# Patient Record
Sex: FEMALE | Race: WHITE | NOT HISPANIC OR LATINO
[De-identification: names, ages, dates, MRNs, and addresses within clinical notes are randomized per-mention and may not be internally consistent; named-entity substitution may affect disease eponyms.]

---

## 2017-12-18 ENCOUNTER — RECORD ABSTRACTING (OUTPATIENT)
Age: 77
End: 2017-12-18

## 2017-12-18 DIAGNOSIS — Z86.79 PERSONAL HISTORY OF OTHER DISEASES OF THE CIRCULATORY SYSTEM: ICD-10-CM

## 2017-12-18 DIAGNOSIS — Z80.9 FAMILY HISTORY OF MALIGNANT NEOPLASM, UNSPECIFIED: ICD-10-CM

## 2017-12-18 DIAGNOSIS — Z87.19 PERSONAL HISTORY OF OTHER DISEASES OF THE DIGESTIVE SYSTEM: ICD-10-CM

## 2017-12-18 DIAGNOSIS — I36.1 NONRHEUMATIC TRICUSPID (VALVE) INSUFFICIENCY: ICD-10-CM

## 2017-12-18 DIAGNOSIS — Z78.9 OTHER SPECIFIED HEALTH STATUS: ICD-10-CM

## 2017-12-18 DIAGNOSIS — Z87.891 PERSONAL HISTORY OF NICOTINE DEPENDENCE: ICD-10-CM

## 2017-12-18 DIAGNOSIS — Z86.69 PERSONAL HISTORY OF OTHER DISEASES OF THE NERVOUS SYSTEM AND SENSE ORGANS: ICD-10-CM

## 2017-12-18 DIAGNOSIS — E66.01 MORBID (SEVERE) OBESITY DUE TO EXCESS CALORIES: ICD-10-CM

## 2017-12-20 ENCOUNTER — APPOINTMENT (OUTPATIENT)
Dept: CARDIOLOGY | Facility: CLINIC | Age: 77
End: 2017-12-20
Payer: MEDICARE

## 2017-12-20 VITALS
BODY MASS INDEX: 27.32 KG/M2 | WEIGHT: 170 LBS | SYSTOLIC BLOOD PRESSURE: 160 MMHG | HEART RATE: 60 BPM | HEIGHT: 66 IN | DIASTOLIC BLOOD PRESSURE: 82 MMHG

## 2017-12-20 DIAGNOSIS — Z86.39 PERSONAL HISTORY OF OTHER ENDOCRINE, NUTRITIONAL AND METABOLIC DISEASE: ICD-10-CM

## 2017-12-20 DIAGNOSIS — I51.7 CARDIOMEGALY: ICD-10-CM

## 2017-12-20 DIAGNOSIS — Z87.898 PERSONAL HISTORY OF OTHER SPECIFIED CONDITIONS: ICD-10-CM

## 2017-12-20 PROCEDURE — 99214 OFFICE O/P EST MOD 30 MIN: CPT

## 2018-01-05 ENCOUNTER — APPOINTMENT (OUTPATIENT)
Dept: CARDIOLOGY | Facility: CLINIC | Age: 78
End: 2018-01-05

## 2018-01-10 ENCOUNTER — APPOINTMENT (OUTPATIENT)
Dept: CARDIOLOGY | Facility: CLINIC | Age: 78
End: 2018-01-10

## 2018-01-18 ENCOUNTER — APPOINTMENT (OUTPATIENT)
Dept: CARDIOLOGY | Facility: CLINIC | Age: 78
End: 2018-01-18

## 2018-03-26 ENCOUNTER — OUTPATIENT (OUTPATIENT)
Dept: OUTPATIENT SERVICES | Facility: HOSPITAL | Age: 78
LOS: 1 days | End: 2018-03-26

## 2018-06-18 ENCOUNTER — OUTPATIENT (OUTPATIENT)
Dept: OUTPATIENT SERVICES | Facility: HOSPITAL | Age: 78
LOS: 1 days | End: 2018-06-18

## 2018-12-27 ENCOUNTER — NON-APPOINTMENT (OUTPATIENT)
Age: 78
End: 2018-12-27

## 2018-12-27 ENCOUNTER — APPOINTMENT (OUTPATIENT)
Dept: CARDIOLOGY | Facility: CLINIC | Age: 78
End: 2018-12-27
Payer: MEDICARE

## 2018-12-27 VITALS
HEART RATE: 74 BPM | HEIGHT: 66 IN | OXYGEN SATURATION: 95 % | DIASTOLIC BLOOD PRESSURE: 60 MMHG | SYSTOLIC BLOOD PRESSURE: 110 MMHG | BODY MASS INDEX: 27.64 KG/M2 | WEIGHT: 172 LBS

## 2018-12-27 DIAGNOSIS — Z86.39 PERSONAL HISTORY OF OTHER ENDOCRINE, NUTRITIONAL AND METABOLIC DISEASE: ICD-10-CM

## 2018-12-27 DIAGNOSIS — I07.1 RHEUMATIC TRICUSPID INSUFFICIENCY: ICD-10-CM

## 2018-12-27 PROCEDURE — 93000 ELECTROCARDIOGRAM COMPLETE: CPT

## 2018-12-27 NOTE — PHYSICAL EXAM
[General Appearance - Well Developed] : well developed [Normal Appearance] : normal appearance [Well Groomed] : well groomed [General Appearance - Well Nourished] : well nourished [No Deformities] : no deformities [General Appearance - In No Acute Distress] : no acute distress [Normal Conjunctiva] : the conjunctiva exhibited no abnormalities [Eyelids - No Xanthelasma] : the eyelids demonstrated no xanthelasmas [Normal Oral Mucosa] : normal oral mucosa [No Oral Pallor] : no oral pallor [No Oral Cyanosis] : no oral cyanosis [Normal Jugular Venous A Waves Present] : normal jugular venous A waves present [Normal Jugular Venous V Waves Present] : normal jugular venous V waves present [No Jugular Venous Soni A Waves] : no jugular venous soni A waves [Respiration, Rhythm And Depth] : normal respiratory rhythm and effort [Exaggerated Use Of Accessory Muscles For Inspiration] : no accessory muscle use [Auscultation Breath Sounds / Voice Sounds] : lungs were clear to auscultation bilaterally [Heart Rate And Rhythm] : heart rate and rhythm were normal [Heart Sounds] : normal S1 and S2 [Murmurs] : no murmurs present [Abdomen Soft] : soft [Abdomen Tenderness] : non-tender [Abdomen Mass (___ Cm)] : no abdominal mass palpated [Abnormal Walk] : normal gait [Gait - Sufficient For Exercise Testing] : the gait was sufficient for exercise testing [Skin Color & Pigmentation] : normal skin color and pigmentation [] : no rash [No Venous Stasis] : no venous stasis [Skin Lesions] : no skin lesions [No Skin Ulcers] : no skin ulcer [No Xanthoma] : no  xanthoma was observed [Oriented To Time, Place, And Person] : oriented to person, place, and time [Affect] : the affect was normal [Mood] : the mood was normal [No Anxiety] : not feeling anxious

## 2018-12-27 NOTE — REVIEW OF SYSTEMS
[Negative] : Heme/Lymph [Shortness Of Breath] : no shortness of breath [Dyspnea on exertion] : not dyspnea during exertion [Chest  Pressure] : no chest pressure [Chest Pain] : no chest pain [Lower Ext Edema] : no extremity edema [Leg Claudication] : no intermittent leg claudication [Palpitations] : no palpitations

## 2018-12-27 NOTE — DISCUSSION/SUMMARY
[FreeTextEntry1] : Terrie is a 78-year-old female with medical history detailed above and active medical issues including:\par  \par –No anginal symptoms, sedentary lifestyle,  multiple cardiac risk factors. Patient declines performing a current stress test. \par \par –Hypertension stable on low sodium diet, Followup home BP to confirm at goal <130/80.  \par \par –History of seizure on Tegretol and Keppra\par \par –GERD\par \par –Borderline dyslipidemia\par \par Advised patient to follow active lifestyle with regular cardiovascular exercise. Patient educated on lifestyle and diet modification with low sodium low fat diet and avoidance of excessive alcohol. Patient is aware to call with any symptoms or concerns. \par \par Patient currently not on cardiac medication.  Repeat labs will be ordered with copy to  PMD.\par \par Cardiology followup 6 months with 2-D echocardiogram to assess for  LV systolic function, wall motion and  structural heart disease. Carotid and abdominal ultrasound to assess for obstructive PAD. \par \par Terrie will follow up with Dr Marylu Abarca  for primary care.\par

## 2018-12-27 NOTE — REASON FOR VISIT
[Follow-Up - Clinic] : a clinic follow-up of [Hyperlipidemia] : hyperlipidemia [Hypertension] : hypertension [FreeTextEntry1] : Terrie is a 78-year-old female with a history of borderline hypertension, borderline dyslipidemia, reflux, GERD, seizure disorder on Tegretol and Keppra.\par \par Cardiovascular review of symptoms is negative for exertional chest pain, dyspnea, palpitations, dizziness or syncope.  No PND or orthopnea leg edema.  No bleeding or black stool.\par \par Terrie is not exercising on regular basis, again I have stressed walking 15 minutes on a daily basis with a goal of 30 to 60 minutes per day.  I have reminded her to reduce sodium intake to 2 g per day and to reduce or discontinue caffeine. Patient declined performing a current stress test\par \par Patient had seizure July 2016 Keppra dose was increased.\par  \par 2-D echo October 2016, LVEF 79%, according posterior mitral valve leaflet without prolapse, trace MR, mild TR, normal PASP.\par \par Stress echo performed on October 2011, no evidence of exercise-induced myocardial ischemia with normal LVEF of 60% at 88% of max predicted heart rate 4 minutes 29 seconds Hamilton protocol. No significant exertional symptoms. Non-ischemic EKG response. \par \par 2DEcho October 2015, normal LVEF of 60%, mild diastolic dysfunction, mild MR and TR. \par \par Carotid abdominal ultrasound revealing nonobstructive plaque, normal aortic dimension.\par \par 2DEcho June 17, 2013, normal LVEF of 60%, mild diastolic dysfunction, mild MR and TR. \par \par Carotid abdominal ultrasound revealing nonobstructive plaque, normal aortic dimension\par \par

## 2019-01-18 ENCOUNTER — APPOINTMENT (OUTPATIENT)
Dept: CARDIOLOGY | Facility: CLINIC | Age: 79
End: 2019-01-18

## 2019-07-31 ENCOUNTER — OUTPATIENT (OUTPATIENT)
Dept: OUTPATIENT SERVICES | Facility: HOSPITAL | Age: 79
LOS: 1 days | End: 2019-07-31

## 2019-08-01 ENCOUNTER — OUTPATIENT (OUTPATIENT)
Dept: OUTPATIENT SERVICES | Facility: HOSPITAL | Age: 79
LOS: 1 days | End: 2019-08-01

## 2019-08-02 ENCOUNTER — APPOINTMENT (OUTPATIENT)
Dept: CARDIOLOGY | Facility: CLINIC | Age: 79
End: 2019-08-02

## 2019-08-02 ENCOUNTER — RECORD ABSTRACTING (OUTPATIENT)
Age: 79
End: 2019-08-02

## 2019-08-05 ENCOUNTER — OUTPATIENT (OUTPATIENT)
Dept: OUTPATIENT SERVICES | Facility: HOSPITAL | Age: 79
LOS: 1 days | End: 2019-08-05

## 2019-09-02 ENCOUNTER — OUTPATIENT (OUTPATIENT)
Dept: OUTPATIENT SERVICES | Facility: HOSPITAL | Age: 79
LOS: 1 days | End: 2019-09-02

## 2019-09-02 PROCEDURE — 71045 X-RAY EXAM CHEST 1 VIEW: CPT | Mod: 26

## 2019-09-02 PROCEDURE — 99285 EMERGENCY DEPT VISIT HI MDM: CPT

## 2019-09-02 PROCEDURE — 70450 CT HEAD/BRAIN W/O DYE: CPT | Mod: 26

## 2019-09-03 ENCOUNTER — OUTPATIENT (OUTPATIENT)
Dept: OUTPATIENT SERVICES | Facility: HOSPITAL | Age: 79
LOS: 1 days | End: 2019-09-03

## 2019-09-03 PROCEDURE — 99285 EMERGENCY DEPT VISIT HI MDM: CPT

## 2019-09-03 PROCEDURE — 93306 TTE W/DOPPLER COMPLETE: CPT | Mod: 26

## 2019-09-04 ENCOUNTER — OUTPATIENT (OUTPATIENT)
Dept: OUTPATIENT SERVICES | Facility: HOSPITAL | Age: 79
LOS: 1 days | End: 2019-09-04

## 2019-09-04 ENCOUNTER — INPATIENT (INPATIENT)
Facility: HOSPITAL | Age: 79
LOS: 0 days | Discharge: HOME | End: 2019-09-05
Attending: FAMILY MEDICINE
Payer: MEDICARE

## 2019-09-04 PROCEDURE — 99214 OFFICE O/P EST MOD 30 MIN: CPT

## 2019-09-05 ENCOUNTER — OUTPATIENT (OUTPATIENT)
Dept: OUTPATIENT SERVICES | Facility: HOSPITAL | Age: 79
LOS: 1 days | End: 2019-09-05

## 2019-09-19 ENCOUNTER — APPOINTMENT (OUTPATIENT)
Dept: CARDIOLOGY | Facility: CLINIC | Age: 79
End: 2019-09-19
Payer: MEDICARE

## 2019-09-19 VITALS
WEIGHT: 165 LBS | DIASTOLIC BLOOD PRESSURE: 70 MMHG | BODY MASS INDEX: 26.52 KG/M2 | HEIGHT: 66 IN | HEART RATE: 72 BPM | OXYGEN SATURATION: 98 % | SYSTOLIC BLOOD PRESSURE: 110 MMHG

## 2019-09-19 PROCEDURE — 99214 OFFICE O/P EST MOD 30 MIN: CPT

## 2019-09-19 RX ORDER — LISINOPRIL 10 MG/1
10 TABLET ORAL DAILY
Refills: 0 | Status: COMPLETED | COMMUNITY
End: 2019-09-19

## 2019-09-19 RX ORDER — CRANBERRY FRUIT EXTRACT 200 MG
CAPSULE ORAL
Refills: 0 | Status: ACTIVE | COMMUNITY

## 2019-09-19 RX ORDER — CRANBERRY FRUIT EXTRACT 200 MG
CAPSULE ORAL
Refills: 0 | Status: DISCONTINUED | COMMUNITY

## 2019-09-19 NOTE — REVIEW OF SYSTEMS
[Negative] : Heme/Lymph [Shortness Of Breath] : no shortness of breath [Dyspnea on exertion] : not dyspnea during exertion [Chest  Pressure] : no chest pressure [Chest Pain] : no chest pain [Lower Ext Edema] : no extremity edema [Leg Claudication] : no intermittent leg claudication [Palpitations] : no palpitations [Joint Stiffness] : joint stiffness [Limb Weakness (Paresis)] : limb weakness

## 2019-09-19 NOTE — PHYSICAL EXAM
[General Appearance - Well Developed] : well developed [Well Groomed] : well groomed [Normal Appearance] : normal appearance [General Appearance - Well Nourished] : well nourished [No Deformities] : no deformities [General Appearance - In No Acute Distress] : no acute distress [Eyelids - No Xanthelasma] : the eyelids demonstrated no xanthelasmas [Normal Conjunctiva] : the conjunctiva exhibited no abnormalities [Normal Oral Mucosa] : normal oral mucosa [No Oral Pallor] : no oral pallor [No Oral Cyanosis] : no oral cyanosis [Normal Jugular Venous A Waves Present] : normal jugular venous A waves present [Normal Jugular Venous V Waves Present] : normal jugular venous V waves present [No Jugular Venous Soni A Waves] : no jugular venous soni A waves [Respiration, Rhythm And Depth] : normal respiratory rhythm and effort [Auscultation Breath Sounds / Voice Sounds] : lungs were clear to auscultation bilaterally [Exaggerated Use Of Accessory Muscles For Inspiration] : no accessory muscle use [Heart Sounds] : normal S1 and S2 [Heart Rate And Rhythm] : heart rate and rhythm were normal [Murmurs] : no murmurs present [Abdomen Soft] : soft [Abdomen Tenderness] : non-tender [Abdomen Mass (___ Cm)] : no abdominal mass palpated [Abnormal Walk] : normal gait [Gait - Sufficient For Exercise Testing] : the gait was sufficient for exercise testing [Skin Color & Pigmentation] : normal skin color and pigmentation [] : no rash [No Venous Stasis] : no venous stasis [Skin Lesions] : no skin lesions [No Skin Ulcers] : no skin ulcer [Oriented To Time, Place, And Person] : oriented to person, place, and time [No Xanthoma] : no  xanthoma was observed [Affect] : the affect was normal [Mood] : the mood was normal [No Anxiety] : not feeling anxious [FreeTextEntry1] : frail elderly female ambulating with a walker, in the office today with her son and

## 2019-09-19 NOTE — REASON FOR VISIT
[Follow-Up - Clinic] : a clinic follow-up of [Hyperlipidemia] : hyperlipidemia [Hypertension] : hypertension [FreeTextEntry2] : [facility] admission August 2019, vasovagal syncope, hyponatremia, Na120  [FreeTextEntry1] : Terrie is a 79-year-old female with a history of borderline hypertension, borderline dyslipidemia, reflux, GERD, seizure disorder on Tegretol and Keppra.\par \par Patient in the office today with her  and son who are able to confirm accurate history.\par \par Cardiovascular review of symptoms is negative for exertional chest pain, dyspnea, palpitations, dizziness or syncope.  No PND or orthopnea leg edema.  No bleeding or black stool.\par \par Patient has limited ambulation with a walker, no recent falling. Patient is not exercising on regular basis, again I have stressed walking 15 minutes on a daily basis.  Recommended increased oral hydration with electrolyte suppliment drinks, avoid caffeine and alcohol intake. Patient declined performing a current stress test\par \par Patient had seizure July 2016 Keppra dose was increased.\par \[facility] Echocardiogram 8/3/19 [facility], LVEF 65% mild diastolic dysfunction, trace MR, normal RVSP\[facility]  \[facility] 2-D echo October 2016, LVEF 79%, according posterior mitral valve leaflet without prolapse, trace MR, mild TR, normal PASP.\par \par Stress echo performed on October 2011, no evidence of exercise-induced myocardial ischemia with normal LVEF of 60% at 88% of max predicted heart rate 4 minutes 29 seconds Hamilton protocol. No significant exertional symptoms. Non-ischemic EKG response. \par \[facility] 2DEcho October 2015, normal LVEF of 60%, mild diastolic dysfunction, mild MR and TR. \par \par Carotid abdominal ultrasound revealing nonobstructive plaque, normal aortic dimension.\par \[facility] 2DEcho June 17, 2013, normal LVEF of 60%, mild diastolic dysfunction, mild MR and TR. \par \par Carotid abdominal ultrasound revealing nonobstructive plaque, normal aortic dimension\par \par

## 2019-09-19 NOTE — DISCUSSION/SUMMARY
[FreeTextEntry1] : Terrie is a 78-year-old female with medical history detailed above and active medical issues including:\par  \par - [facility] admission August 2019, vasovagal syncope, hyponatremia, Na120. Recommended increased oral hydration with electrolyte suppliment drinks, avoid caffeine and alcohol intake.\par \par –No anginal symptoms, sedentary lifestyle,  multiple cardiac risk factors. Patient declines performing a current stress test. \par \par –Hypertension stable on low sodium diet, Followup home BP to confirm at goal <130/80.  \par \par –History of seizure on Tegretol and Keppra\par \par –GERD\par \par –Borderline dyslipidemia\par \par Advised patient to follow active lifestyle with regular cardiovascular exercise. Patient educated on lifestyle and diet modification with low sodium low fat diet and avoidance of excessive alcohol. Patient is aware to call with any symptoms or concerns. \par \par Patient currently not on cardiac medication.  Repeat labs will be ordered with copy to  PMD.\par \par Cardiology followup 6 months. Current cardiac medications remain unchanged. Repeat labs will be ordered with PMD.\par  \par Terrie will follow up with Dr Marylu Abarca  for primary care.\par

## 2019-10-28 ENCOUNTER — MEDICATION RENEWAL (OUTPATIENT)
Age: 79
End: 2019-10-28

## 2019-12-10 ENCOUNTER — APPOINTMENT (OUTPATIENT)
Dept: CARDIOLOGY | Facility: CLINIC | Age: 79
End: 2019-12-10

## 2020-01-12 ENCOUNTER — TRANSCRIPTION ENCOUNTER (OUTPATIENT)
Age: 80
End: 2020-01-12

## 2020-01-27 ENCOUNTER — APPOINTMENT (OUTPATIENT)
Dept: CARDIOLOGY | Facility: CLINIC | Age: 80
End: 2020-01-27
Payer: MEDICARE

## 2020-01-27 VITALS
WEIGHT: 152 LBS | HEART RATE: 72 BPM | BODY MASS INDEX: 24.43 KG/M2 | OXYGEN SATURATION: 99 % | DIASTOLIC BLOOD PRESSURE: 60 MMHG | HEIGHT: 66 IN | SYSTOLIC BLOOD PRESSURE: 136 MMHG

## 2020-01-27 PROCEDURE — 99214 OFFICE O/P EST MOD 30 MIN: CPT

## 2020-01-27 RX ORDER — LISINOPRIL 10 MG/1
10 TABLET ORAL DAILY
Qty: 90 | Refills: 3 | Status: DISCONTINUED | COMMUNITY
Start: 2019-09-19 | End: 2020-01-27

## 2020-01-27 NOTE — REASON FOR VISIT
[Follow-Up - Clinic] : a clinic follow-up of [Hyperlipidemia] : hyperlipidemia [Hypertension] : hypertension [FreeTextEntry2] : [facility] admission August 2019, vasovagal syncope, hyponatremia, Na120  [FreeTextEntry1] : Terrie is a 79-year-old female with a history of borderline hypertension, borderline dyslipidemia, reflux, GERD, seizure disorder on Tegretol and Keppra.\par \par Patient in the office today with her  and son who are able to confirm accurate history. \par \par Patient had facial and lip swelling possible angioedema and lisinopril discontinued.   Losartan 10 mg daily prescribed.  Home blood pressures have been averaging 115/65 prior to starting losartan.  Losartan will be discontinued with monitoring home blood pressures. \par \par Cardiovascular review of symptoms is negative for exertional chest pain, dyspnea, palpitations, dizziness or syncope.  No PND or orthopnea leg edema.  No bleeding or black stool.\par \par Patient has improved ambulation participating in physical therapy for balance and ambulation.  Currently riding elliptical machine for 12 minutes.  Recommended increased oral hydration with electrolyte suppliment drinks, avoid caffeine and alcohol intake. Patient declined performing a current stress test\par \par Patient had seizure July 2016 Keppra dose was increased.\par \par Echocardiogram 8/3/19 [facility], LVEF 65% mild diastolic dysfunction, trace MR, normal RVSP\par  \par 2-D echo October 2016, LVEF 79%, according posterior mitral valve leaflet without prolapse, trace MR, mild TR, normal PASP.\par \par Stress echo performed on October 2011, no evidence of exercise-induced myocardial ischemia with normal LVEF of 60% at 88% of max predicted heart rate 4 minutes 29 seconds Hamilton protocol. No significant exertional symptoms. Non-ischemic EKG response. \par \par 2DEcho October 2015, normal LVEF of 60%, mild diastolic dysfunction, mild MR and TR. \par \par Carotid abdominal ultrasound revealing nonobstructive plaque, normal aortic dimension.\par \par 2DEcho June 17, 2013, normal LVEF of 60%, mild diastolic dysfunction, mild MR and TR. \par \par Carotid abdominal ultrasound revealing nonobstructive plaque, normal aortic dimension\par \par

## 2020-01-27 NOTE — PHYSICAL EXAM
[General Appearance - Well Developed] : well developed [Normal Appearance] : normal appearance [Well Groomed] : well groomed [General Appearance - Well Nourished] : well nourished [No Deformities] : no deformities [General Appearance - In No Acute Distress] : no acute distress [Normal Conjunctiva] : the conjunctiva exhibited no abnormalities [Eyelids - No Xanthelasma] : the eyelids demonstrated no xanthelasmas [Normal Oral Mucosa] : normal oral mucosa [No Oral Pallor] : no oral pallor [No Oral Cyanosis] : no oral cyanosis [Normal Jugular Venous A Waves Present] : normal jugular venous A waves present [Normal Jugular Venous V Waves Present] : normal jugular venous V waves present [No Jugular Venous Soni A Waves] : no jugular venous soni A waves [Respiration, Rhythm And Depth] : normal respiratory rhythm and effort [Exaggerated Use Of Accessory Muscles For Inspiration] : no accessory muscle use [Auscultation Breath Sounds / Voice Sounds] : lungs were clear to auscultation bilaterally [Heart Rate And Rhythm] : heart rate and rhythm were normal [Heart Sounds] : normal S1 and S2 [Murmurs] : no murmurs present [Abdomen Soft] : soft [Abdomen Tenderness] : non-tender [Abdomen Mass (___ Cm)] : no abdominal mass palpated [Abnormal Walk] : normal gait [Gait - Sufficient For Exercise Testing] : the gait was sufficient for exercise testing [Skin Color & Pigmentation] : normal skin color and pigmentation [] : no rash [No Venous Stasis] : no venous stasis [Skin Lesions] : no skin lesions [No Skin Ulcers] : no skin ulcer [No Xanthoma] : no  xanthoma was observed [Oriented To Time, Place, And Person] : oriented to person, place, and time [Affect] : the affect was normal [Mood] : the mood was normal [No Anxiety] : not feeling anxious [FreeTextEntry1] : frail elderly female ambulating with a walker, in the office today with her son and

## 2020-01-27 NOTE — DISCUSSION/SUMMARY
[FreeTextEntry1] : Terrie is a 79-year-old female with medical history detailed above and active medical issues including:\par  \par - [facility] admission August 2019, vasovagal syncope, hyponatremia, Initial Na120 improved to 137. Recommended increased oral hydration with electrolyte suppliment drinks, avoid caffeine and alcohol intake.\par \par –No anginal symptoms, sedentary lifestyle,  multiple cardiac risk factors. Patient declines performing a current stress test. \par \par –Hypertension. Patient had facial and lip swelling possible angioedema and lisinopril discontinued.   Losartan 10 mg daily prescribed.  Home blood pressures have been averaging 115/65 prior to starting losartan.  Losartan will be discontinued with monitoring home blood pressures. \par \par –History of seizure on Tegretol and Keppra\par \par –GERD\par \par –Borderline dyslipidemia\par \par Advised patient to follow active lifestyle with regular cardiovascular exercise. Patient educated on lifestyle and diet modification with low sodium low fat diet and avoidance of excessive alcohol. Patient is aware to call with any symptoms or concerns. \par \par Patient currently not on cardiac medication.  Repeat labs will be ordered with copy to  PMD.\par \par Cardiology followup 6 months. Current cardiac medications remain unchanged. Repeat labs will be ordered with PMD.\par  \par Terrie will follow up with Dr Marylu Abarca  for primary care.\par

## 2020-01-27 NOTE — REVIEW OF SYSTEMS
[Joint Stiffness] : joint stiffness [Limb Weakness (Paresis)] : limb weakness [Negative] : Heme/Lymph [Shortness Of Breath] : no shortness of breath [Dyspnea on exertion] : not dyspnea during exertion [Chest  Pressure] : no chest pressure [Chest Pain] : no chest pain [Lower Ext Edema] : no extremity edema [Leg Claudication] : no intermittent leg claudication [Palpitations] : no palpitations

## 2020-02-06 ENCOUNTER — APPOINTMENT (OUTPATIENT)
Dept: CARDIOLOGY | Facility: CLINIC | Age: 80
End: 2020-02-06
Payer: MEDICARE

## 2020-02-06 PROCEDURE — 93979 VASCULAR STUDY: CPT

## 2020-02-06 PROCEDURE — 93880 EXTRACRANIAL BILAT STUDY: CPT

## 2020-03-20 ENCOUNTER — APPOINTMENT (OUTPATIENT)
Dept: CARDIOLOGY | Facility: CLINIC | Age: 80
End: 2020-03-20

## 2020-08-05 ENCOUNTER — EMERGENCY (EMERGENCY)
Facility: HOSPITAL | Age: 80
LOS: 1 days | End: 2020-08-05
Admitting: EMERGENCY MEDICINE
Payer: MEDICARE

## 2020-08-05 PROCEDURE — 99285 EMERGENCY DEPT VISIT HI MDM: CPT | Mod: CS

## 2020-08-05 PROCEDURE — 71045 X-RAY EXAM CHEST 1 VIEW: CPT | Mod: 26

## 2020-08-05 PROCEDURE — 71275 CT ANGIOGRAPHY CHEST: CPT | Mod: 26

## 2020-12-11 ENCOUNTER — APPOINTMENT (OUTPATIENT)
Dept: CARDIOLOGY | Facility: CLINIC | Age: 80
End: 2020-12-11
Payer: MEDICARE

## 2020-12-11 VITALS
HEIGHT: 66 IN | DIASTOLIC BLOOD PRESSURE: 60 MMHG | BODY MASS INDEX: 27.32 KG/M2 | OXYGEN SATURATION: 99 % | SYSTOLIC BLOOD PRESSURE: 130 MMHG | HEART RATE: 70 BPM | WEIGHT: 170 LBS | TEMPERATURE: 98 F

## 2020-12-11 PROCEDURE — 93000 ELECTROCARDIOGRAM COMPLETE: CPT | Mod: 59

## 2020-12-11 PROCEDURE — 0296T: CPT

## 2020-12-11 PROCEDURE — 99214 OFFICE O/P EST MOD 30 MIN: CPT

## 2020-12-11 RX ORDER — QUETIAPINE 25 MG/1
25 TABLET, FILM COATED ORAL
Refills: 0 | Status: ACTIVE | COMMUNITY

## 2020-12-11 RX ORDER — CARBAMAZEPINE 200 MG/1
200 TABLET ORAL EVERY MORNING
Refills: 0 | Status: DISCONTINUED | COMMUNITY
End: 2020-12-11

## 2020-12-11 NOTE — REASON FOR VISIT
[Follow-Up - Clinic] : a clinic follow-up of [Hyperlipidemia] : hyperlipidemia [Hypertension] : hypertension [FreeTextEntry2] : [facility] admission August 2019, vasovagal syncope, hyponatremia, Na120  [FreeTextEntry1] : Terrie is a 80-year-old female with a history of borderline hypertension, borderline dyslipidemia, reflux, GERD, seizure disorder on Tegretol and Keppra.\par \par Patient in the office today with her  and son who are able to confirm accurate history.\par \par Cardiovascular review of symptoms is negative for exertional chest pain, dyspnea, palpitations, dizziness or syncope.  No PND or orthopnea leg edema.  No bleeding or black stool.\par \par Patient has limited ambulation with a walker, no recent falling. Patient is not exercising on regular basis, again I have stressed walking 15 minutes on a daily basis.  Recommended increased oral hydration with electrolyte suppliment drinks, avoid caffeine and alcohol intake. Patient declined performing a current stress test\par \par Patient had seizure July 2016 Keppra dose was increased.\par \[facility] Echocardiogram 8/3/19 [facility], LVEF 65% mild diastolic dysfunction, trace MR, normal RVSP\[facility]  \[facility] 2-D echo October 2016, LVEF 79%, according posterior mitral valve leaflet without prolapse, trace MR, mild TR, normal PASP.\par \par Stress echo performed on October 2011, no evidence of exercise-induced myocardial ischemia with normal LVEF of 60% at 88% of max predicted heart rate 4 minutes 29 seconds Hamilton protocol. No significant exertional symptoms. Non-ischemic EKG response. \par \[facility] 2DEcho October 2015, normal LVEF of 60%, mild diastolic dysfunction, mild MR and TR. \par \par Carotid abdominal ultrasound revealing nonobstructive plaque, normal aortic dimension.\par \[facility] 2DEcho June 17, 2013, normal LVEF of 60%, mild diastolic dysfunction, mild MR and TR. \par \par Carotid abdominal ultrasound revealing nonobstructive plaque, normal aortic dimension\par \par

## 2020-12-11 NOTE — DISCUSSION/SUMMARY
[FreeTextEntry1] : Terrie is a 80-year-old female with medical history detailed above and active medical issues including:\par  \par - [facility] admission August 2019, vasovagal syncope, hyponatremia, Na120. Recommended increased oral hydration with electrolyte suppliment drinks, avoid caffeine and alcohol intake.  Zio patch 48-hour heart monitor started today.  Patient will have 2-D echocardiogram to assess LV systolic function, structural heart disease  with TEB followup.\par \par –No anginal symptoms, sedentary lifestyle,  multiple cardiac risk factors. Patient declines performing a current stress test. \par \par –Hypertension stable on low sodium diet, Followup home BP to confirm at goal <130/80.  \par \par –History of seizure on Tegretol and Keppra\par \par –GERD\par \par –Borderline dyslipidemia\par \par Advised patient to follow active lifestyle with regular cardiovascular exercise. Patient educated on lifestyle and diet modification with low sodium low fat diet and avoidance of excessive alcohol. Patient is aware to call with any symptoms or concerns. \par \par Patient currently not on cardiac medication.  Repeat labs will be ordered with copy to  PMD.\par \par Cardiology followup 6 months. Current cardiac medications remain unchanged. Repeat labs will be ordered with PMD.\par  \par Terrie will follow up with Dr Jacqueline Bedolla for primary care.\par

## 2021-01-08 PROCEDURE — 93244 EXT ECG>48HR<7D REV&INTERPJ: CPT

## 2021-01-15 ENCOUNTER — APPOINTMENT (OUTPATIENT)
Dept: CARDIOLOGY | Facility: CLINIC | Age: 81
End: 2021-01-15
Payer: MEDICARE

## 2021-01-15 PROCEDURE — 93306 TTE W/DOPPLER COMPLETE: CPT

## 2021-01-25 ENCOUNTER — APPOINTMENT (OUTPATIENT)
Dept: CARDIOLOGY | Facility: CLINIC | Age: 81
End: 2021-01-25
Payer: MEDICARE

## 2021-01-25 DIAGNOSIS — R06.00 DYSPNEA, UNSPECIFIED: ICD-10-CM

## 2021-01-25 PROCEDURE — 99443: CPT | Mod: 95

## 2021-01-25 RX ORDER — LATANOPROST/PF 0.005 %
0.01 DROPS OPHTHALMIC (EYE)
Qty: 8 | Refills: 0 | Status: ACTIVE | COMMUNITY
Start: 2020-12-14

## 2021-01-25 RX ORDER — AZELASTINE HYDROCHLORIDE AND FLUTICASONE PROPIONATE 137; 50 UG/1; UG/1
137-50 SPRAY, METERED NASAL
Qty: 23 | Refills: 0 | Status: ACTIVE | COMMUNITY
Start: 2020-08-05

## 2021-01-25 RX ORDER — LACOSAMIDE 150 MG/1
150 TABLET, FILM COATED ORAL
Qty: 60 | Refills: 0 | Status: ACTIVE | COMMUNITY
Start: 2021-01-12

## 2021-01-25 RX ORDER — ALBUTEROL SULFATE 90 UG/1
108 (90 BASE) INHALANT RESPIRATORY (INHALATION)
Qty: 8 | Refills: 0 | Status: ACTIVE | COMMUNITY
Start: 2020-08-05

## 2021-01-25 RX ORDER — LEVETIRACETAM 500 MG/1
500 TABLET, FILM COATED ORAL
Qty: 360 | Refills: 0 | Status: ACTIVE | COMMUNITY
Start: 2020-12-16

## 2021-01-27 ENCOUNTER — NON-APPOINTMENT (OUTPATIENT)
Age: 81
End: 2021-01-27

## 2021-02-02 ENCOUNTER — NON-APPOINTMENT (OUTPATIENT)
Age: 81
End: 2021-02-02

## 2021-02-21 ENCOUNTER — TRANSCRIPTION ENCOUNTER (OUTPATIENT)
Age: 81
End: 2021-02-21

## 2021-06-14 ENCOUNTER — APPOINTMENT (OUTPATIENT)
Dept: OPHTHALMOLOGY | Facility: CLINIC | Age: 81
End: 2021-06-14

## 2021-06-23 ENCOUNTER — APPOINTMENT (OUTPATIENT)
Dept: OPHTHALMOLOGY | Facility: CLINIC | Age: 81
End: 2021-06-23

## 2021-06-25 ENCOUNTER — NON-APPOINTMENT (OUTPATIENT)
Age: 81
End: 2021-06-25

## 2021-06-25 ENCOUNTER — APPOINTMENT (OUTPATIENT)
Dept: OPHTHALMOLOGY | Facility: CLINIC | Age: 81
End: 2021-06-25
Payer: MEDICARE

## 2021-06-25 PROCEDURE — 92014 COMPRE OPH EXAM EST PT 1/>: CPT

## 2021-06-25 PROCEDURE — 92134 CPTRZ OPH DX IMG PST SGM RTA: CPT

## 2021-08-25 ENCOUNTER — APPOINTMENT (OUTPATIENT)
Dept: OPHTHALMOLOGY | Facility: CLINIC | Age: 81
End: 2021-08-25
Payer: MEDICARE

## 2021-08-25 ENCOUNTER — NON-APPOINTMENT (OUTPATIENT)
Age: 81
End: 2021-08-25

## 2021-08-25 PROCEDURE — 99213 OFFICE O/P EST LOW 20 MIN: CPT

## 2021-09-03 ENCOUNTER — APPOINTMENT (OUTPATIENT)
Dept: OPHTHALMOLOGY | Facility: CLINIC | Age: 81
End: 2021-09-03
Payer: MEDICARE

## 2021-09-03 ENCOUNTER — NON-APPOINTMENT (OUTPATIENT)
Age: 81
End: 2021-09-03

## 2021-09-03 PROCEDURE — 99213 OFFICE O/P EST LOW 20 MIN: CPT

## 2021-12-06 ENCOUNTER — APPOINTMENT (OUTPATIENT)
Dept: OPHTHALMOLOGY | Facility: CLINIC | Age: 81
End: 2021-12-06

## 2022-03-18 ENCOUNTER — APPOINTMENT (OUTPATIENT)
Dept: OPHTHALMOLOGY | Facility: CLINIC | Age: 82
End: 2022-03-18
Payer: MEDICARE

## 2022-03-18 ENCOUNTER — NON-APPOINTMENT (OUTPATIENT)
Age: 82
End: 2022-03-18

## 2022-03-18 PROCEDURE — 99213 OFFICE O/P EST LOW 20 MIN: CPT

## 2022-07-15 ENCOUNTER — APPOINTMENT (OUTPATIENT)
Dept: OPHTHALMOLOGY | Facility: CLINIC | Age: 82
End: 2022-07-15

## 2023-01-19 NOTE — PHYSICAL EXAM
[General Appearance - Well Developed] : well developed [Normal Appearance] : normal appearance [Well Groomed] : well groomed [General Appearance - Well Nourished] : well nourished [No Deformities] : no deformities [General Appearance - In No Acute Distress] : no acute distress [FreeTextEntry1] : frail elderly female ambulating with a walker, in the office today with her son and  [Normal Conjunctiva] : the conjunctiva exhibited no abnormalities [Eyelids - No Xanthelasma] : the eyelids demonstrated no xanthelasmas [Normal Oral Mucosa] : normal oral mucosa [No Oral Pallor] : no oral pallor [No Oral Cyanosis] : no oral cyanosis [Normal Jugular Venous A Waves Present] : normal jugular venous A waves present [Normal Jugular Venous V Waves Present] : normal jugular venous V waves present [No Jugular Venous Soni A Waves] : no jugular venous soni A waves [Respiration, Rhythm And Depth] : normal respiratory rhythm and effort [Exaggerated Use Of Accessory Muscles For Inspiration] : no accessory muscle use [Auscultation Breath Sounds / Voice Sounds] : lungs were clear to auscultation bilaterally [Heart Rate And Rhythm] : heart rate and rhythm were normal [Heart Sounds] : normal S1 and S2 [Murmurs] : no murmurs present [Abdomen Soft] : soft [Abdomen Tenderness] : non-tender [Abdomen Mass (___ Cm)] : no abdominal mass palpated [Abnormal Walk] : normal gait [Gait - Sufficient For Exercise Testing] : the gait was sufficient for exercise testing [Skin Color & Pigmentation] : normal skin color and pigmentation [] : no rash [No Venous Stasis] : no venous stasis [Skin Lesions] : no skin lesions [No Skin Ulcers] : no skin ulcer [No Xanthoma] : no  xanthoma was observed [Oriented To Time, Place, And Person] : oriented to person, place, and time [Affect] : the affect was normal [No Anxiety] : not feeling anxious [Mood] : the mood was normal

## 2023-01-19 NOTE — REASON FOR VISIT
[FreeTextEntry1] : Terrie is a 80-year-old female with a history of borderline hypertension, borderline dyslipidemia, reflux, GERD, seizure disorder on Tegretol and Keppra.\par \par Patient in the office today with her  and son who are able to confirm accurate history.\par \par Cardiovascular review of symptoms is negative for exertional chest pain, dyspnea, palpitations, dizziness or syncope.  No PND or orthopnea leg edema.  No bleeding or black stool.\par \par Patient has limited ambulation with a walker, no recent falling. Patient is not exercising on regular basis, again I have stressed walking 15 minutes on a daily basis.  Recommended increased oral hydration with electrolyte suppliment drinks, avoid caffeine and alcohol intake. Patient declined performing a current stress test\par \par Patient had seizure July 2016 Keppra dose was increased.\par \[facility] Echocardiogram 8/3/19 [facility], LVEF 65% mild diastolic dysfunction, trace MR, normal RVSP\[facility]  \[facility] 2-D echo October 2016, LVEF 79%, according posterior mitral valve leaflet without prolapse, trace MR, mild TR, normal PASP.\par \par Stress echo performed on October 2011, no evidence of exercise-induced myocardial ischemia with normal LVEF of 60% at 88% of max predicted heart rate 4 minutes 29 seconds Hamilton protocol. No significant exertional symptoms. Non-ischemic EKG response. \par \[facility] 2DEcho October 2015, normal LVEF of 60%, mild diastolic dysfunction, mild MR and TR. \par \par Carotid abdominal ultrasound revealing nonobstructive plaque, normal aortic dimension.\par \[facility] 2DEcho June 17, 2013, normal LVEF of 60%, mild diastolic dysfunction, mild MR and TR. \par \par Carotid abdominal ultrasound revealing nonobstructive plaque, normal aortic dimension\par \par  [Follow-Up - Clinic] : a clinic follow-up of [Hyperlipidemia] : hyperlipidemia [Hypertension] : hypertension [FreeTextEntry2] : [facility] admission August 2019, vasovagal syncope, hyponatremia, Na120

## 2023-01-26 ENCOUNTER — APPOINTMENT (OUTPATIENT)
Dept: CARDIOLOGY | Facility: CLINIC | Age: 83
End: 2023-01-26

## 2023-02-13 ENCOUNTER — APPOINTMENT (OUTPATIENT)
Dept: CARDIOLOGY | Facility: CLINIC | Age: 83
End: 2023-02-13
Payer: MEDICARE

## 2023-02-13 VITALS
OXYGEN SATURATION: 97 % | TEMPERATURE: 97.1 F | BODY MASS INDEX: 26.84 KG/M2 | SYSTOLIC BLOOD PRESSURE: 136 MMHG | DIASTOLIC BLOOD PRESSURE: 70 MMHG | HEIGHT: 66 IN | HEART RATE: 63 BPM | WEIGHT: 167 LBS

## 2023-02-13 PROCEDURE — 99215 OFFICE O/P EST HI 40 MIN: CPT

## 2023-02-13 PROCEDURE — 93000 ELECTROCARDIOGRAM COMPLETE: CPT

## 2023-02-13 RX ORDER — NITROFURANTOIN (MONOHYDRATE/MACROCRYSTALS) 25; 75 MG/1; MG/1
100 CAPSULE ORAL
Qty: 14 | Refills: 0 | Status: DISCONTINUED | COMMUNITY
Start: 2020-07-27 | End: 2023-02-13

## 2023-02-13 RX ORDER — LEVOTHYROXINE SODIUM 0.05 MG/1
50 TABLET ORAL
Qty: 30 | Refills: 0 | Status: ACTIVE | COMMUNITY
Start: 2022-11-03

## 2023-02-13 RX ORDER — LEVETIRACETAM 250 MG/1
250 TABLET, FILM COATED ORAL
Refills: 0 | Status: ACTIVE | COMMUNITY

## 2023-02-13 NOTE — REASON FOR VISIT
[Follow-Up - Clinic] : a clinic follow-up of [Hyperlipidemia] : hyperlipidemia [Hypertension] : hypertension [Other: ____] : [unfilled] [FreeTextEntry1] : Terrie is a 80-year-old female with a history of borderline hypertension, borderline dyslipidemia, reflux, GERD, seizure disorder on Tegretol and Keppra.\par \par Patient in the office today with her  and son who are able to confirm accurate history.\par \par Cardiovascular review of symptoms is negative for exertional chest pain, dyspnea, palpitations, dizziness or syncope.  No PND or orthopnea leg edema.  No bleeding or black stool.\par \par Patient has limited ambulation with a walker, no recent falling. Patient is not exercising on regular basis, again I have stressed walking 15 minutes on a daily basis.  Recommended increased oral hydration with electrolyte suppliment drinks, avoid caffeine and alcohol intake. Patient declined performing a current stress test\par \par Patient had seizure July 2016 Keppra dose was increased.\par \[facility] Echocardiogram 8/3/19 [facility], LVEF 65% mild diastolic dysfunction, trace MR, normal RVSP\[facility]  \[facility] 2-D echo October 2016, LVEF 79%, according posterior mitral valve leaflet without prolapse, trace MR, mild TR, normal PASP.\par \par Stress echo performed on October 2011, no evidence of exercise-induced myocardial ischemia with normal LVEF of 60% at 88% of max predicted heart rate 4 minutes 29 seconds Hamilton protocol. No significant exertional symptoms. Non-ischemic EKG response. \par \[facility] 2DEcho October 2015, normal LVEF of 60%, mild diastolic dysfunction, mild MR and TR. \par \par Carotid abdominal ultrasound revealing nonobstructive plaque, normal aortic dimension.\par \[facility] 2DEcho June 17, 2013, normal LVEF of 60%, mild diastolic dysfunction, mild MR and TR. \par \par Carotid abdominal ultrasound revealing nonobstructive plaque, normal aortic dimension\par \par  [FreeTextEntry2] : [facility] admission August 2019, vasovagal syncope, hyponatremia, Na120

## 2023-02-13 NOTE — DISCUSSION/SUMMARY
[FreeTextEntry1] : Terrie is a 80-year-old female with medical history detailed above and active medical issues including:\par  \par - Nocturnal dyspnea, palps, bradycardia, RAGHU on CPAP.  Echocardiogram ordered to evaluate for structural heart disease, carotid and abdominal ultrasound to evaluate for PAD.  Zio patch 1 week heart monitor started today.  Coronary CTA and coronary calcium score ordered to access for obstructive CAD and risk stratification.\par \par - [facility] admission August 2019, vasovagal syncope, hyponatremia, Na120. Recommended increased oral hydration with electrolyte suppliment drinks, avoid caffeine and alcohol intake. \par \par –Hypertension stable on low sodium diet, Followup home BP to confirm at goal <130/80.  \par \par –History of seizure on Tegretol and Keppra\par \par –GERD\par \par –Borderline dyslipidemia\par \par Advised patient to follow active lifestyle with regular cardiovascular exercise. Patient educated on lifestyle and diet modification with low sodium low fat diet and avoidance of excessive alcohol. Patient is aware to call with any symptoms or concerns. \par \par Patient currently not on cardiac medication.  Repeat labs will be ordered with copy to  PMD.\par \par Repeat labs ordered.\par  \par Terrie will follow up with Dr Jacqueline Bedolla for primary care.\par

## 2023-03-06 ENCOUNTER — APPOINTMENT (OUTPATIENT)
Dept: CARDIOLOGY | Facility: CLINIC | Age: 83
End: 2023-03-06
Payer: MEDICARE

## 2023-03-06 PROCEDURE — 93244 EXT ECG>48HR<7D REV&INTERPJ: CPT

## 2023-03-22 ENCOUNTER — APPOINTMENT (OUTPATIENT)
Dept: CARDIOLOGY | Facility: CLINIC | Age: 83
End: 2023-03-22
Payer: MEDICARE

## 2023-03-22 PROCEDURE — 93979 VASCULAR STUDY: CPT

## 2023-03-22 PROCEDURE — 93306 TTE W/DOPPLER COMPLETE: CPT

## 2023-03-22 PROCEDURE — 93880 EXTRACRANIAL BILAT STUDY: CPT

## 2023-03-23 ENCOUNTER — APPOINTMENT (OUTPATIENT)
Dept: CARDIOLOGY | Facility: CLINIC | Age: 83
End: 2023-03-23

## 2023-03-30 NOTE — REASON FOR VISIT
[Other: ____] : [unfilled] [FreeTextEntry1] : Terrie is a 80-year-old female with a history of borderline hypertension, borderline dyslipidemia, reflux, GERD, seizure disorder on Tegretol and Keppra.\par \par Patient in the office today with her  and son who are able to confirm accurate history.\par \par Cardiovascular review of symptoms is negative for exertional chest pain, dyspnea, palpitations, dizziness or syncope.  No PND or orthopnea leg edema.  No bleeding or black stool.\par \par Patient has limited ambulation with a walker, no recent falling. Patient is not exercising on regular basis, again I have stressed walking 15 minutes on a daily basis.  Recommended increased oral hydration with electrolyte suppliment drinks, avoid caffeine and alcohol intake. Patient declined performing a current stress test\par \par Patient had seizure July 2016 Keppra dose was increased.\par \[facility] Echocardiogram 8/3/19 [facility], LVEF 65% mild diastolic dysfunction, trace MR, normal RVSP\[facility]  \[facility] 2-D echo October 2016, LVEF 79%, according posterior mitral valve leaflet without prolapse, trace MR, mild TR, normal PASP.\par \par Stress echo performed on October 2011, no evidence of exercise-induced myocardial ischemia with normal LVEF of 60% at 88% of max predicted heart rate 4 minutes 29 seconds Hamilton protocol. No significant exertional symptoms. Non-ischemic EKG response. \par \[facility] 2DEcho October 2015, normal LVEF of 60%, mild diastolic dysfunction, mild MR and TR. \par \par Carotid abdominal ultrasound revealing nonobstructive plaque, normal aortic dimension.\par \[facility] 2DEcho June 17, 2013, normal LVEF of 60%, mild diastolic dysfunction, mild MR and TR. \par \par Carotid abdominal ultrasound revealing nonobstructive plaque, normal aortic dimension\par \par  [Follow-Up - Clinic] : a clinic follow-up of [Hyperlipidemia] : hyperlipidemia [Hypertension] : hypertension [FreeTextEntry2] : [facility] admission August 2019, vasovagal syncope, hyponatremia, Na120

## 2023-03-30 NOTE — PHYSICAL EXAM
[General Appearance - Well Developed] : well developed [Normal Appearance] : normal appearance [Well Groomed] : well groomed [General Appearance - Well Nourished] : well nourished [No Deformities] : no deformities [FreeTextEntry1] : frail elderly female ambulating with a walker, in the office today with her son and  [General Appearance - In No Acute Distress] : no acute distress [Normal Conjunctiva] : the conjunctiva exhibited no abnormalities [Eyelids - No Xanthelasma] : the eyelids demonstrated no xanthelasmas [Normal Oral Mucosa] : normal oral mucosa [No Oral Pallor] : no oral pallor [No Oral Cyanosis] : no oral cyanosis [Normal Jugular Venous A Waves Present] : normal jugular venous A waves present [Normal Jugular Venous V Waves Present] : normal jugular venous V waves present [No Jugular Venous Soni A Waves] : no jugular venous soni A waves [Respiration, Rhythm And Depth] : normal respiratory rhythm and effort [Exaggerated Use Of Accessory Muscles For Inspiration] : no accessory muscle use [Auscultation Breath Sounds / Voice Sounds] : lungs were clear to auscultation bilaterally [Heart Rate And Rhythm] : heart rate and rhythm were normal [Heart Sounds] : normal S1 and S2 [Murmurs] : no murmurs present [Abdomen Soft] : soft [Abdomen Tenderness] : non-tender [Abdomen Mass (___ Cm)] : no abdominal mass palpated [Abnormal Walk] : normal gait [Gait - Sufficient For Exercise Testing] : the gait was sufficient for exercise testing [Skin Color & Pigmentation] : normal skin color and pigmentation [] : no rash [No Venous Stasis] : no venous stasis [Skin Lesions] : no skin lesions [No Skin Ulcers] : no skin ulcer [No Xanthoma] : no  xanthoma was observed [Oriented To Time, Place, And Person] : oriented to person, place, and time [Affect] : the affect was normal [Mood] : the mood was normal [No Anxiety] : not feeling anxious

## 2023-03-31 ENCOUNTER — NON-APPOINTMENT (OUTPATIENT)
Age: 83
End: 2023-03-31

## 2023-03-31 ENCOUNTER — APPOINTMENT (OUTPATIENT)
Dept: OPHTHALMOLOGY | Facility: CLINIC | Age: 83
End: 2023-03-31
Payer: MEDICARE

## 2023-03-31 PROCEDURE — 92014 COMPRE OPH EXAM EST PT 1/>: CPT

## 2023-04-04 ENCOUNTER — APPOINTMENT (OUTPATIENT)
Dept: CARDIOLOGY | Facility: CLINIC | Age: 83
End: 2023-04-04

## 2023-04-10 ENCOUNTER — APPOINTMENT (OUTPATIENT)
Dept: CARDIOLOGY | Facility: CLINIC | Age: 83
End: 2023-04-10
Payer: MEDICARE

## 2023-04-10 RX ORDER — MEMANTINE HYDROCHLORIDE 5 MG-10 MG
28 X 5 MG & KIT ORAL
Qty: 49 | Refills: 0 | Status: DISCONTINUED | COMMUNITY
Start: 2023-02-08 | End: 2023-04-10

## 2023-04-10 NOTE — PHYSICAL EXAM
[FreeTextEntry1] : frail elderly female ambulating with a walker, in the office today with her son and

## 2023-04-10 NOTE — REASON FOR VISIT
[FreeTextEntry1] : Terrie is a 80-year-old female with a history of borderline hypertension, borderline dyslipidemia, reflux, GERD, seizure disorder on Tegretol and Keppra.\par \par Patient in the office today with her  and son who are able to confirm accurate history.\par \par Cardiovascular review of symptoms is negative for exertional chest pain, dyspnea, palpitations, dizziness or syncope.  No PND or orthopnea leg edema.  No bleeding or black stool.\par \par Patient has limited ambulation with a walker, no recent falling. Patient is not exercising on regular basis, again I have stressed walking 15 minutes on a daily basis.  Recommended increased oral hydration with electrolyte suppliment drinks, avoid caffeine and alcohol intake. Patient declined performing a current stress test\par \par Patient had seizure July 2016 Keppra dose was increased.\par \[facility] Echocardiogram 8/3/19 [facility], LVEF 65% mild diastolic dysfunction, trace MR, normal RVSP\[facility]  \[facility] 2-D echo October 2016, LVEF 79%, according posterior mitral valve leaflet without prolapse, trace MR, mild TR, normal PASP.\par \par Stress echo performed on October 2011, no evidence of exercise-induced myocardial ischemia with normal LVEF of 60% at 88% of max predicted heart rate 4 minutes 29 seconds Hamilton protocol. No significant exertional symptoms. Non-ischemic EKG response. \par \[facility] 2DEcho October 2015, normal LVEF of 60%, mild diastolic dysfunction, mild MR and TR. \par \par Carotid abdominal ultrasound revealing nonobstructive plaque, normal aortic dimension.\par \[facility] 2DEcho June 17, 2013, normal LVEF of 60%, mild diastolic dysfunction, mild MR and TR. \par \par Carotid abdominal ultrasound revealing nonobstructive plaque, normal aortic dimension\par \par  [FreeTextEntry2] : [facility] admission August 2019, vasovagal syncope, hyponatremia, Na120

## 2023-04-12 ENCOUNTER — APPOINTMENT (OUTPATIENT)
Dept: CARDIOLOGY | Facility: CLINIC | Age: 83
End: 2023-04-12
Payer: MEDICARE

## 2023-04-12 VITALS
WEIGHT: 165 LBS | DIASTOLIC BLOOD PRESSURE: 64 MMHG | OXYGEN SATURATION: 97 % | HEIGHT: 66 IN | SYSTOLIC BLOOD PRESSURE: 150 MMHG | BODY MASS INDEX: 26.52 KG/M2 | HEART RATE: 79 BPM

## 2023-04-12 PROCEDURE — 99215 OFFICE O/P EST HI 40 MIN: CPT

## 2023-04-12 RX ORDER — ASCORBIC ACID 125 MG
TABLET,CHEWABLE ORAL
Refills: 0 | Status: ACTIVE | COMMUNITY

## 2023-04-12 RX ORDER — MULTIVIT-MIN/IRON/FOLIC ACID/K 18-600-40
CAPSULE ORAL
Refills: 0 | Status: ACTIVE | COMMUNITY

## 2023-04-12 RX ORDER — CHLORHEXIDINE GLUCONATE 4 %
1000 LIQUID (ML) TOPICAL
Refills: 0 | Status: ACTIVE | COMMUNITY

## 2023-04-12 RX ORDER — ZINC OXIDE 13 %
CREAM (GRAM) TOPICAL
Refills: 0 | Status: ACTIVE | COMMUNITY

## 2023-04-12 RX ORDER — ERGOCALCIFEROL 1.25 MG/1
1.25 MG CAPSULE, LIQUID FILLED ORAL
Refills: 0 | Status: ACTIVE | COMMUNITY

## 2023-04-12 NOTE — DISCUSSION/SUMMARY
[FreeTextEntry1] : Terrie is a 83-year-old female with medical history detailed above and active medical issues including:\par  \par - Nocturnal dyspnea, palps, bradycardia, RAGHU on CPAP. \par \par - [facility] admission August 2019, vasovagal syncope, hyponatremia, Na120. Recommended increased oral hydration with electrolyte suppliment drinks, avoid caffeine and alcohol intake. \par \par – Hypertension stable on low sodium diet, Followup home BP to confirm at goal <130/80.  \par \par – History of seizure on Tegretol and Keppra\par \par – GERD\par \par – Borderline dyslipidemia\par \par Advised patient to follow active lifestyle with regular cardiovascular exercise. Patient educated on lifestyle and diet modification with low sodium low fat diet and avoidance of excessive alcohol. Patient is aware to call with any symptoms or concerns. \par \par Patient currently not on cardiac medication.  Repeat labs will be ordered with copy to  PMD.\par \par Repeat labs ordered.\par  \par Terrie will follow up with Dr Jacqueline Bedolla for primary care.\par

## 2023-04-12 NOTE — REASON FOR VISIT
[Other: ____] : [unfilled] [Follow-Up - Clinic] : a clinic follow-up of [Hyperlipidemia] : hyperlipidemia [Hypertension] : hypertension [FreeTextEntry1] : Terrie is a 83-year-old female with a history of borderline hypertension, borderline dyslipidemia, reflux, GERD, seizure disorder on Tegretol and Keppra.\par \par Patient in the office today with her son who are able to confirm accurate history.\par \par Patient has sleep apnea, bradycardia and mild hypoxia with difficulty to wake.  Patient is currently using CPAP and following up with pulmonology,  Dr. Joiner.  Patient is following up with neurologist for seizure. \par \par Cardiovascular review of symptoms is negative for exertional chest pain, dyspnea, palpitations, dizziness or syncope.  No PND or orthopnea leg edema.  No bleeding or black stool.\par \par Patient has limited ambulation with a walker, no recent falling. Patient is not exercising on regular basis, again I have stressed walking 15 minutes on a daily basis.  Recommended increased oral hydration with electrolyte suppliment drinks, avoid caffeine and alcohol intake. Patient declined performing a current stress test\par \par Patient had seizure July 2016 Keppra dose was increased.\par \par Echocardiogram March 2023 LVEF 65%, mild-moderate MR and TR normal RVSP.\par \par Carotid and abdominal ultrasound March 2023, mild nonobstructive plaque, normal abdominal aortic size. \par \par Echocardiogram 8/3/19 [facility], LVEF 65% mild diastolic dysfunction, trace MR, normal RVSP\par  \par 2-D echo October 2016, LVEF 79%, according posterior mitral valve leaflet without prolapse, trace MR, mild TR, normal PASP.\par \par Stress echo performed on October 2011, no evidence of exercise-induced myocardial ischemia with normal LVEF of 60% at 88% of max predicted heart rate 4 minutes 29 seconds Hamilton protocol. No significant exertional symptoms. Non-ischemic EKG response. \par \par 2DEcho October 2015, normal LVEF of 60%, mild diastolic dysfunction, mild MR and TR. \par \par Carotid abdominal ultrasound revealing nonobstructive plaque, normal aortic dimension.\par \par 2DEcho June 17, 2013, normal LVEF of 60%, mild diastolic dysfunction, mild MR and TR. \par \par Carotid abdominal ultrasound revealing nonobstructive plaque, normal aortic dimension\par \par  [FreeTextEntry2] : [facility] admission August 2019, vasovagal syncope, hyponatremia, Na120

## 2023-04-24 ENCOUNTER — NON-APPOINTMENT (OUTPATIENT)
Age: 83
End: 2023-04-24

## 2023-09-22 ENCOUNTER — APPOINTMENT (OUTPATIENT)
Dept: OPHTHALMOLOGY | Facility: CLINIC | Age: 83
End: 2023-09-22
Payer: MEDICARE

## 2023-09-22 ENCOUNTER — NON-APPOINTMENT (OUTPATIENT)
Age: 83
End: 2023-09-22

## 2023-09-22 PROCEDURE — 92133 CPTRZD OPH DX IMG PST SGM ON: CPT

## 2023-09-22 PROCEDURE — 92083 EXTENDED VISUAL FIELD XM: CPT

## 2023-10-02 ENCOUNTER — APPOINTMENT (OUTPATIENT)
Dept: OPHTHALMOLOGY | Facility: CLINIC | Age: 83
End: 2023-10-02
Payer: MEDICARE

## 2023-10-02 ENCOUNTER — NON-APPOINTMENT (OUTPATIENT)
Age: 83
End: 2023-10-02

## 2023-10-02 PROCEDURE — 99214 OFFICE O/P EST MOD 30 MIN: CPT

## 2023-10-18 ENCOUNTER — APPOINTMENT (OUTPATIENT)
Dept: CARDIOLOGY | Facility: CLINIC | Age: 83
End: 2023-10-18

## 2024-03-05 PROBLEM — R06.09 DOE (DYSPNEA ON EXERTION): Status: ACTIVE | Noted: 2020-01-27

## 2024-03-05 PROBLEM — R00.2 PALPITATIONS: Status: ACTIVE | Noted: 2020-12-11

## 2024-03-05 PROBLEM — E78.2 HYPERLIPIDEMIA, MIXED: Status: ACTIVE | Noted: 2020-01-27

## 2024-03-05 PROBLEM — I49.3 PVC (PREMATURE VENTRICULAR CONTRACTION): Status: ACTIVE | Noted: 2020-12-11

## 2024-03-05 PROBLEM — R03.0 BORDERLINE HYPERTENSION: Status: ACTIVE | Noted: 2017-12-20

## 2024-03-05 PROBLEM — T78.3XXA ANGIO-EDEMA: Status: ACTIVE | Noted: 2020-01-27

## 2024-03-05 NOTE — PHYSICAL EXAM
[General Appearance - Well Developed] : well developed [Normal Appearance] : normal appearance [Well Groomed] : well groomed [No Deformities] : no deformities [General Appearance - Well Nourished] : well nourished [General Appearance - In No Acute Distress] : no acute distress [Normal Conjunctiva] : the conjunctiva exhibited no abnormalities [FreeTextEntry1] : frail elderly female ambulating with a walker, in the office today with her son and  [Normal Oral Mucosa] : normal oral mucosa [Eyelids - No Xanthelasma] : the eyelids demonstrated no xanthelasmas [No Oral Pallor] : no oral pallor [No Oral Cyanosis] : no oral cyanosis [Normal Jugular Venous A Waves Present] : normal jugular venous A waves present [Normal Jugular Venous V Waves Present] : normal jugular venous V waves present [No Jugular Venous Soni A Waves] : no jugular venous soni A waves [Respiration, Rhythm And Depth] : normal respiratory rhythm and effort [Exaggerated Use Of Accessory Muscles For Inspiration] : no accessory muscle use [Heart Rate And Rhythm] : heart rate and rhythm were normal [Auscultation Breath Sounds / Voice Sounds] : lungs were clear to auscultation bilaterally [Heart Sounds] : normal S1 and S2 [Murmurs] : no murmurs present [Abdomen Soft] : soft [Abdomen Tenderness] : non-tender [Abnormal Walk] : normal gait [Abdomen Mass (___ Cm)] : no abdominal mass palpated [Gait - Sufficient For Exercise Testing] : the gait was sufficient for exercise testing [Skin Color & Pigmentation] : normal skin color and pigmentation [] : no rash [No Venous Stasis] : no venous stasis [Skin Lesions] : no skin lesions [No Xanthoma] : no  xanthoma was observed [No Skin Ulcers] : no skin ulcer [Oriented To Time, Place, And Person] : oriented to person, place, and time [Affect] : the affect was normal [Mood] : the mood was normal [No Anxiety] : not feeling anxious

## 2024-03-05 NOTE — REASON FOR VISIT
[Other: ____] : [unfilled] [Follow-Up - Clinic] : a clinic follow-up of [FreeTextEntry1] : Terrie is a 84-year-old female with a history of borderline hypertension, borderline dyslipidemia, reflux, GERD, seizure disorder on Tegretol and Keppra.  Patient in the office today with her son who are able to confirm accurate history.  Patient has sleep apnea, bradycardia and mild hypoxia with difficulty to wake.  Patient is currently using CPAP and following up with pulmonology,  Dr. Joiner.  Patient is following up with neurologist for seizure.   Cardiovascular review of symptoms is negative for exertional chest pain, dyspnea, palpitations, dizziness or syncope.  No PND or orthopnea leg edema.  No bleeding or black stool.  Patient has limited ambulation with a walker, no recent falling. Patient is not exercising on regular basis, again I have stressed walking 15 minutes on a daily basis.  Recommended increased oral hydration with electrolyte suppliment drinks, avoid caffeine and alcohol intake. Patient declined performing a current stress test  Patient had seizure July 2016 Keppra dose was increased.  Echocardiogram March 2023 LVEF 65%, mild-moderate MR and TR normal RVSP.  Carotid and abdominal ultrasound March 2023, mild nonobstructive plaque, normal abdominal aortic size.   Echocardiogram 8/3/19 [facility], LVEF 65% mild diastolic dysfunction, trace MR, normal RVSP   2-D echo October 2016, LVEF 79%, according posterior mitral valve leaflet without prolapse, trace MR, mild TR, normal PASP.  Stress echo performed on October 2011, no evidence of exercise-induced myocardial ischemia with normal LVEF of 60% at 88% of max predicted heart rate 4 minutes 29 seconds Hamilton protocol. No significant exertional symptoms. Non-ischemic EKG response.   2DEcho October 2015, normal LVEF of 60%, mild diastolic dysfunction, mild MR and TR.   Carotid abdominal ultrasound revealing nonobstructive plaque, normal aortic dimension.  2DEcho June 17, 2013, normal LVEF of 60%, mild diastolic dysfunction, mild MR and TR.   Carotid abdominal ultrasound revealing nonobstructive plaque, normal aortic dimension   [Hyperlipidemia] : hyperlipidemia [FreeTextEntry2] : [facility] admission August 2019, vasovagal syncope, hyponatremia, Na120  [Hypertension] : hypertension

## 2024-03-05 NOTE — DISCUSSION/SUMMARY
[FreeTextEntry1] : Terrie is a 84-year-old female with medical history detailed above and active medical issues including:   - Nocturnal dyspnea, palps, bradycardia, RAGHU on CPAP.   - [facility] admission August 2019, vasovagal syncope, hyponatremia, Na120. Recommended increased oral hydration with electrolyte suppliment drinks, avoid caffeine and alcohol intake.   - Hypertension stable on low sodium diet, Followup home BP to confirm at goal <130/80.    - History of seizure on Tegretol and Keppra  - GERD  - Borderline dyslipidemia  Advised patient to follow active lifestyle with regular cardiovascular exercise. Patient educated on lifestyle and diet modification with low sodium low fat diet and avoidance of excessive alcohol. Patient is aware to call with any symptoms or concerns.   Patient currently not on cardiac medication.  Repeat labs will be ordered with copy to  PMD.  Repeat labs ordered.   Terrie will follow up with Dr Jacqueline Bedolla for primary care.

## 2024-03-11 ENCOUNTER — APPOINTMENT (OUTPATIENT)
Dept: CARDIOLOGY | Facility: CLINIC | Age: 84
End: 2024-03-11

## 2024-03-11 DIAGNOSIS — R03.0 ELEVATED BLOOD-PRESSURE READING, W/OUT DIAGNOSIS OF HYPERTENSION: ICD-10-CM

## 2024-03-11 DIAGNOSIS — I49.3 VENTRICULAR PREMATURE DEPOLARIZATION: ICD-10-CM

## 2024-03-11 DIAGNOSIS — R00.2 PALPITATIONS: ICD-10-CM

## 2024-03-11 DIAGNOSIS — E78.2 MIXED HYPERLIPIDEMIA: ICD-10-CM

## 2024-03-11 DIAGNOSIS — R06.09 OTHER FORMS OF DYSPNEA: ICD-10-CM

## 2024-03-11 DIAGNOSIS — T78.3XXA ANGIONEUROTIC EDEMA, INITIAL ENCOUNTER: ICD-10-CM

## 2024-07-04 ENCOUNTER — NON-APPOINTMENT (OUTPATIENT)
Age: 84
End: 2024-07-04

## 2024-08-02 ENCOUNTER — APPOINTMENT (OUTPATIENT)
Dept: OPHTHALMOLOGY | Facility: CLINIC | Age: 84
End: 2024-08-02

## 2024-08-20 ENCOUNTER — RESULT REVIEW (OUTPATIENT)
Age: 84
End: 2024-08-20

## 2024-09-29 ENCOUNTER — NON-APPOINTMENT (OUTPATIENT)
Age: 84
End: 2024-09-29

## 2024-10-25 ENCOUNTER — APPOINTMENT (OUTPATIENT)
Dept: OPHTHALMOLOGY | Facility: CLINIC | Age: 84
End: 2024-10-25

## 2024-12-19 ENCOUNTER — RESULT REVIEW (OUTPATIENT)
Age: 84
End: 2024-12-19

## 2024-12-23 ENCOUNTER — APPOINTMENT (OUTPATIENT)
Dept: CARDIOLOGY | Facility: CLINIC | Age: 84
End: 2024-12-23
Payer: MEDICARE

## 2024-12-23 VITALS
DIASTOLIC BLOOD PRESSURE: 68 MMHG | WEIGHT: 151 LBS | BODY MASS INDEX: 24.37 KG/M2 | SYSTOLIC BLOOD PRESSURE: 118 MMHG | OXYGEN SATURATION: 98 % | HEART RATE: 62 BPM

## 2024-12-23 DIAGNOSIS — T78.3XXA ANGIONEUROTIC EDEMA, INITIAL ENCOUNTER: ICD-10-CM

## 2024-12-23 DIAGNOSIS — I49.3 VENTRICULAR PREMATURE DEPOLARIZATION: ICD-10-CM

## 2024-12-23 DIAGNOSIS — E78.2 MIXED HYPERLIPIDEMIA: ICD-10-CM

## 2024-12-23 DIAGNOSIS — R00.2 PALPITATIONS: ICD-10-CM

## 2024-12-23 DIAGNOSIS — R03.0 ELEVATED BLOOD-PRESSURE READING, W/OUT DIAGNOSIS OF HYPERTENSION: ICD-10-CM

## 2024-12-23 DIAGNOSIS — R06.09 OTHER FORMS OF DYSPNEA: ICD-10-CM

## 2024-12-23 PROCEDURE — G2211 COMPLEX E/M VISIT ADD ON: CPT

## 2024-12-23 PROCEDURE — 99215 OFFICE O/P EST HI 40 MIN: CPT

## 2024-12-23 PROCEDURE — 93242 EXT ECG>48HR<7D RECORDING: CPT

## 2024-12-23 RX ORDER — DIVALPROEX SODIUM 500 MG/1
500 TABLET, DELAYED RELEASE ORAL
Refills: 0 | Status: ACTIVE | COMMUNITY

## 2024-12-23 RX ORDER — BRIVARACETAM 50 MG/1
50 TABLET, FILM COATED ORAL
Refills: 0 | Status: ACTIVE | COMMUNITY

## 2024-12-23 RX ORDER — TRAZODONE HYDROCHLORIDE 50 MG/1
50 TABLET ORAL
Refills: 0 | Status: ACTIVE | COMMUNITY

## 2024-12-23 RX ORDER — SERTRALINE HYDROCHLORIDE 100 MG/1
100 TABLET, FILM COATED ORAL DAILY
Refills: 0 | Status: ACTIVE | COMMUNITY

## 2024-12-23 RX ORDER — MEMANTINE HYDROCHLORIDE 10 MG/1
10 TABLET, FILM COATED ORAL TWICE DAILY
Refills: 0 | Status: ACTIVE | COMMUNITY

## 2024-12-23 RX ORDER — OLANZAPINE 5 MG/1
5 TABLET, FILM COATED ORAL TWICE DAILY
Refills: 0 | Status: ACTIVE | COMMUNITY

## 2024-12-23 RX ORDER — KRILL/OM-3/DHA/EPA/PHOSPHO/AST 1000-230MG
81 CAPSULE ORAL DAILY
Refills: 0 | Status: ACTIVE | COMMUNITY

## 2024-12-23 RX ORDER — LACOSAMIDE 200 MG/1
200 TABLET, FILM COATED ORAL TWICE DAILY
Refills: 0 | Status: ACTIVE | COMMUNITY

## 2025-01-13 RX ORDER — MIDODRINE HYDROCHLORIDE 5 MG/1
5 TABLET ORAL EVERY 6 HOURS
Refills: 0 | Status: ACTIVE | COMMUNITY
Start: 2025-01-13

## 2025-01-16 ENCOUNTER — APPOINTMENT (OUTPATIENT)
Dept: CARDIOLOGY | Facility: CLINIC | Age: 85
End: 2025-01-16
Payer: MEDICARE

## 2025-01-16 DIAGNOSIS — R06.09 OTHER FORMS OF DYSPNEA: ICD-10-CM

## 2025-01-16 DIAGNOSIS — T78.3XXA ANGIONEUROTIC EDEMA, INITIAL ENCOUNTER: ICD-10-CM

## 2025-01-16 DIAGNOSIS — E78.2 MIXED HYPERLIPIDEMIA: ICD-10-CM

## 2025-01-16 DIAGNOSIS — R03.0 ELEVATED BLOOD-PRESSURE READING, W/OUT DIAGNOSIS OF HYPERTENSION: ICD-10-CM

## 2025-01-16 DIAGNOSIS — R00.2 PALPITATIONS: ICD-10-CM

## 2025-01-16 PROCEDURE — 99212 OFFICE O/P EST SF 10 MIN: CPT | Mod: 2W

## 2025-01-20 ENCOUNTER — APPOINTMENT (OUTPATIENT)
Dept: OPHTHALMOLOGY | Facility: CLINIC | Age: 85
End: 2025-01-20

## 2025-01-30 ENCOUNTER — APPOINTMENT (OUTPATIENT)
Dept: CARDIOLOGY | Facility: CLINIC | Age: 85
End: 2025-01-30

## 2025-01-30 DIAGNOSIS — E78.2 MIXED HYPERLIPIDEMIA: ICD-10-CM

## 2025-01-30 DIAGNOSIS — I49.3 VENTRICULAR PREMATURE DEPOLARIZATION: ICD-10-CM

## 2025-01-30 DIAGNOSIS — T78.3XXA ANGIONEUROTIC EDEMA, INITIAL ENCOUNTER: ICD-10-CM

## 2025-01-30 DIAGNOSIS — R03.0 ELEVATED BLOOD-PRESSURE READING, W/OUT DIAGNOSIS OF HYPERTENSION: ICD-10-CM

## 2025-01-30 DIAGNOSIS — R00.2 PALPITATIONS: ICD-10-CM

## 2025-01-30 DIAGNOSIS — R06.09 OTHER FORMS OF DYSPNEA: ICD-10-CM

## 2025-02-02 ENCOUNTER — NON-APPOINTMENT (OUTPATIENT)
Age: 85
End: 2025-02-02

## 2025-03-10 ENCOUNTER — APPOINTMENT (OUTPATIENT)
Dept: OPHTHALMOLOGY | Facility: CLINIC | Age: 85
End: 2025-03-10

## 2025-03-24 ENCOUNTER — APPOINTMENT (OUTPATIENT)
Dept: CARDIOLOGY | Facility: CLINIC | Age: 85
End: 2025-03-24